# Patient Record
Sex: FEMALE | Race: BLACK OR AFRICAN AMERICAN | NOT HISPANIC OR LATINO | Employment: OTHER | URBAN - METROPOLITAN AREA
[De-identification: names, ages, dates, MRNs, and addresses within clinical notes are randomized per-mention and may not be internally consistent; named-entity substitution may affect disease eponyms.]

---

## 2022-12-21 ENCOUNTER — HOSPITAL ENCOUNTER (OUTPATIENT)
Facility: CLINIC | Age: 22
Discharge: HOME | End: 2022-12-21
Attending: OBSTETRICS & GYNECOLOGY | Admitting: OBSTETRICS & GYNECOLOGY
Payer: MEDICAID

## 2022-12-21 ENCOUNTER — HOSPITAL ENCOUNTER (OUTPATIENT)
Facility: CLINIC | Age: 22
End: 2022-12-21
Admitting: OBSTETRICS & GYNECOLOGY
Payer: MEDICAID

## 2022-12-21 VITALS
HEART RATE: 102 BPM | RESPIRATION RATE: 18 BRPM | SYSTOLIC BLOOD PRESSURE: 122 MMHG | DIASTOLIC BLOOD PRESSURE: 74 MMHG | TEMPERATURE: 97.9 F

## 2022-12-21 PROCEDURE — 250N000013 HC RX MED GY IP 250 OP 250 PS 637: Performed by: STUDENT IN AN ORGANIZED HEALTH CARE EDUCATION/TRAINING PROGRAM

## 2022-12-21 PROCEDURE — 999N000104 HC STATISTIC NO CHARGE

## 2022-12-21 PROCEDURE — G0463 HOSPITAL OUTPT CLINIC VISIT: HCPCS | Mod: 25

## 2022-12-21 PROCEDURE — 250N000013 HC RX MED GY IP 250 OP 250 PS 637: Performed by: OBSTETRICS & GYNECOLOGY

## 2022-12-21 PROCEDURE — 59025 FETAL NON-STRESS TEST: CPT

## 2022-12-21 RX ORDER — LIDOCAINE 40 MG/G
CREAM TOPICAL
Status: DISCONTINUED | OUTPATIENT
Start: 2022-12-21 | End: 2022-12-21 | Stop reason: HOSPADM

## 2022-12-21 RX ORDER — CALCIUM CARBONATE 500 MG/1
500 TABLET, CHEWABLE ORAL DAILY PRN
Status: DISCONTINUED | OUTPATIENT
Start: 2022-12-21 | End: 2022-12-21 | Stop reason: HOSPADM

## 2022-12-21 RX ORDER — ACETAMINOPHEN 325 MG/1
650 TABLET ORAL EVERY 4 HOURS PRN
Status: DISCONTINUED | OUTPATIENT
Start: 2022-12-21 | End: 2022-12-21 | Stop reason: HOSPADM

## 2022-12-21 RX ORDER — DOCUSATE SODIUM 100 MG/1
100 CAPSULE, LIQUID FILLED ORAL 2 TIMES DAILY
Status: DISCONTINUED | OUTPATIENT
Start: 2022-12-21 | End: 2022-12-21 | Stop reason: HOSPADM

## 2022-12-21 RX ADMIN — CALCIUM CARBONATE (ANTACID) CHEW TAB 500 MG 500 MG: 500 CHEW TAB at 18:21

## 2022-12-21 RX ADMIN — DOCUSATE SODIUM 100 MG: 100 CAPSULE, LIQUID FILLED ORAL at 18:31

## 2022-12-21 RX ADMIN — ACETAMINOPHEN 650 MG: 325 TABLET, FILM COATED ORAL at 18:07

## 2022-12-21 ASSESSMENT — ACTIVITIES OF DAILY LIVING (ADL): ADLS_ACUITY_SCORE: 18

## 2022-12-21 NOTE — PROGRESS NOTES
OB Triage Assessment     Josefina Emerson    MRN# [id number]  YOB: 2000      HPI: Josefina Emerson is a 22 year old  at 28w5d by LMP who presents today following a fall. The patient states that she had a fall this morning at 10 am. She states that she did not strike her abdomen or her head but that she did hurt her pubic bone as her legs splayed during the fall. She denies any syncope, chest pain, or shortness of breath. She immigrated from [facility] two weeks ago and has not yet established care.     She reports good fetal movement. Denies LOF, vaginal bleeding, or contractions.  She denies fever, chills, SOB, chest pain, palpitations, N/V, LE swelling/tenderness.  No concerns for headache, vision changes, RUQ or epigastric pain. Patient is very sure that these are not contractions.    Prenatal Labs:   No results found for: ABO, RH, AS, HEPBANG, CHPCRT, GCPCRT, TREPAB, RPR, RUBELLAABIGG, HGB, HIV    GBS Status:   No results found for: GBS      OBHX:   OB History    Para Term  AB Living   5 4 4 0 0 4   SAB IAB Ectopic Multiple Live Births   0 0 0 0 4      # Outcome Date GA Lbr Alejandro/2nd Weight Sex Delivery Anes PTL Lv   5 Current            4 Term            3 Term            2 Term            1 Term                MedicalHX:   History reviewed. No pertinent past medical history.    SurgicalHX:   History reviewed. No pertinent surgical history.    Medications:   No current facility-administered medications on file prior to encounter.  No current outpatient medications on file prior to encounter.      Allergies:  No Known Allergies    FamilyHX:    Family History   Family history unknown: Yes       SocialHX:   Social History     Socioeconomic History    Marital status: Single     Spouse name: None    Number of children: None    Years of education: None    Highest education level: None   Tobacco Use    Smoking status: Never    Smokeless tobacco: Never   Substance and Sexual Activity     Alcohol use: Never    Drug use: Never    Sexual activity: Yes     Partners: Male       ROS: 10 point ROS negative other than above    Physical Exam:  Patient Vitals for the past 24 hrs:   BP Temp Temp src Pulse Resp   22 1608 122/74 97.9  F (36.6  C) Oral 102 18     General: AAOx3, appropriately interactive, NAD, appears generally well  CV: RRR, normal S1/S2, no m/r/g  Lungs: CTAB, non-labored breathing, no wheezes, rales, or rhonchi  Abdomen: soft, gravid, non-tender  Extremities: Non-tender with trace edema bilaterally in LE    FHT: 145 bpm. moderate variability, accels present, no decels   [place]: 0 contractions in ten minutes    Assessment & Plan: 22 year old  at 28w5d by LMP, here for a recent fall. Patient has not yet established care in the . She is currently declining all labs. Given the fact that the patient had a fall at 10 am and is now greater than 4 hours post low impact fall with reassuring fetal monitoring, we discussed discharging to home with follow up at her prenatal appointment that is scheduled for . The patient is amenable to this plan.    # Rule Out Abruption / Abdominal Trauma  - Labs: Patient declining all labs at this time including prenatal and abruption labs. The patient states that she would like to follow up at her prenatal appointment which is scheduled  - Rh unknown: Rhogam not administered   - Greater than 4 hours post fall  - Discussed return precautions including returning to care with any leakage of fluid, contractions, vaginal bleeding, or decreased fetal movement. Also discussed returning to care with any headache, changes in vision, nausea, vomiting, chest pain, shortness of breath, upper abdominal pain, or sudden increases in edema.  - Patient will discharge to home and follow up on  for initial prenatal visit    # PNC  - Prenatal labs declined today  - Imaging: to be organized at her prenatal visit     # FWB:    NST reactive and reassuring at  this time  - Continuous Fetal Monitoring while in triage  - Intrauterine resuscitative measures prn      Patient discussed with Dr. Leoncio Rose MD  OBGYN PGY-3  December 21, 2022 5:53 PM    I personally examined and evaluated Josefina Emerson on 12/21/2022.  I discussed the patient with Dr. Rose and agree with the presentation, exam and plan of care documented in this note with edits by me.   Elise Fang MD MPH

## 2022-12-21 NOTE — ED TRIAGE NOTES
Patient just arrived here from [facility] on December 8th. Patient fell this morning and is currently 8 months pregnant. Patient fell around 10 am in the bathroom. Patient now has abdominal pain. Patient says she felt the baby move this morning before she fell but now has not felt the baby move for the past two hours. Patient reports pain in the genital region.

## 2022-12-22 NOTE — DISCHARGE INSTRUCTIONS
Discharge Instructions for Undelivered Patients    Diet:  * Drink 8 to 12 glasses of liquids (milk, juice, water) every day  * You may eat meals and snacks.    Activity:  * * Count fetal kicks every day   * Call your doctor or nurse midwife if your baby is moving less than usual.    Call your provider if you notice:  * Swelling in your face or increased swelling in your hands or legs.  * Headaches that are not relieved by Tylenol (acetaminophen).  * Changes in your vision (blurring; seeing spots or stars).  * Nausea (sick to your stomach) and vomiting (throwing up).  * Weight gain of 5 pounds per week.  * Heartburn that doesn't go away.  * Signs of bladder infection: Pain when you urinate (use the toilet), needing to go more often or more urgently.  * The bag of kimbrough (membrane) breaks, or you notice leaking in your underwear.  * Bright red blood in your underwear.  * Abdominal (lower belly) or stomach pain.  * For first baby: Contractions (tightenings) less than 5 minutes apart for one hour or more.  * Second (plus) baby: Contractions (tightenings) less than 10 minutes apart and getting stronger.  * Increase or change in vaginal discharge (note the color and amount).

## 2022-12-22 NOTE — PLAN OF CARE
[facility] taking a [facility] in her bathroom this morning. Pt did not hit her belly or her head. She describes slipping on wet floor and doing the splits. She has some muscle pain from this strain. Pt has had no prenatal care. Pt plans to establish care on Dec 30 at [facility]. Pt had no contractions and baby had moderate variability with accelerations present. Discharge instructions explained and understood through .

## 2022-12-30 ENCOUNTER — LAB REQUISITION (OUTPATIENT)
Dept: LAB | Facility: CLINIC | Age: 22
End: 2022-12-30
Payer: MEDICAID

## 2022-12-30 ENCOUNTER — TRANSCRIBE ORDERS (OUTPATIENT)
Dept: MATERNAL FETAL MEDICINE | Facility: CLINIC | Age: 22
End: 2022-12-30

## 2022-12-30 DIAGNOSIS — O09.93 SUPERVISION OF HIGH RISK PREGNANCY, UNSPECIFIED, THIRD TRIMESTER: ICD-10-CM

## 2022-12-30 DIAGNOSIS — O26.90 PREGNANCY RELATED CONDITION, ANTEPARTUM: Primary | ICD-10-CM

## 2022-12-30 PROCEDURE — 86803 HEPATITIS C AB TEST: CPT | Mod: ORL | Performed by: MIDWIFE

## 2022-12-30 PROCEDURE — 86762 RUBELLA ANTIBODY: CPT | Mod: ORL | Performed by: MIDWIFE

## 2022-12-30 PROCEDURE — 87340 HEPATITIS B SURFACE AG IA: CPT | Mod: ORL | Performed by: MIDWIFE

## 2022-12-30 PROCEDURE — 85660 RBC SICKLE CELL TEST: CPT | Mod: ORL | Performed by: MIDWIFE

## 2022-12-30 PROCEDURE — 86780 TREPONEMA PALLIDUM: CPT | Mod: ORL | Performed by: MIDWIFE

## 2022-12-30 PROCEDURE — 87491 CHLMYD TRACH DNA AMP PROBE: CPT | Mod: ORL | Performed by: MIDWIFE

## 2022-12-30 PROCEDURE — 86850 RBC ANTIBODY SCREEN: CPT | Mod: ORL | Performed by: MIDWIFE

## 2022-12-30 PROCEDURE — 87591 N.GONORRHOEAE DNA AMP PROB: CPT | Mod: ORL | Performed by: MIDWIFE

## 2022-12-30 PROCEDURE — 83020 HEMOGLOBIN ELECTROPHORESIS: CPT | Mod: ORL | Performed by: MIDWIFE

## 2022-12-30 PROCEDURE — 87086 URINE CULTURE/COLONY COUNT: CPT | Mod: ORL | Performed by: MIDWIFE

## 2022-12-30 PROCEDURE — 86901 BLOOD TYPING SEROLOGIC RH(D): CPT | Mod: ORL | Performed by: MIDWIFE

## 2022-12-30 PROCEDURE — 82306 VITAMIN D 25 HYDROXY: CPT | Mod: ORL | Performed by: MIDWIFE

## 2022-12-30 PROCEDURE — 86706 HEP B SURFACE ANTIBODY: CPT | Mod: ORL | Performed by: MIDWIFE

## 2022-12-31 LAB
ABO/RH(D): NORMAL
ANTIBODY SCREEN: NEGATIVE
C TRACH DNA SPEC QL NAA+PROBE: NEGATIVE
DEPRECATED CALCIDIOL+CALCIFEROL SERPL-MC: 18 UG/L (ref 20–75)
HBV SURFACE AB SERPL IA-ACNC: 0 M[IU]/ML
HBV SURFACE AB SERPL IA-ACNC: NONREACTIVE M[IU]/ML
HBV SURFACE AG SERPL QL IA: NONREACTIVE
HCV AB SERPL QL IA: NONREACTIVE
N GONORRHOEA DNA SPEC QL NAA+PROBE: NEGATIVE
SPECIMEN EXPIRATION DATE: NORMAL
T PALLIDUM AB SER QL: NONREACTIVE

## 2023-01-01 LAB
BACTERIA UR CULT: NORMAL
HGB A1 MFR BLD: 96.7 %
HGB A2 MFR BLD: 2.8 %
HGB C MFR BLD: 0 %
HGB E MFR BLD: 0 %
HGB F MFR BLD: 0.5 %
HGB FRACT BLD ELPH-IMP: NORMAL
HGB OTHER MFR BLD: 0 %
HGB S BLD QL SOLY: NORMAL
HGB S MFR BLD: 0 %
PATH INTERP BLD-IMP: NORMAL

## 2023-01-02 ENCOUNTER — PRE VISIT (OUTPATIENT)
Dept: MATERNAL FETAL MEDICINE | Facility: CLINIC | Age: 23
End: 2023-01-02

## 2023-01-02 LAB
RUBV IGG SERPL QL IA: 3.03 INDEX
RUBV IGG SERPL QL IA: POSITIVE

## 2023-01-03 ENCOUNTER — HOSPITAL ENCOUNTER (OUTPATIENT)
Dept: ULTRASOUND IMAGING | Facility: CLINIC | Age: 23
Discharge: HOME | End: 2023-01-03
Attending: MIDWIFE
Payer: MEDICAID

## 2023-01-03 ENCOUNTER — OFFICE VISIT (OUTPATIENT)
Dept: MATERNAL FETAL MEDICINE | Facility: CLINIC | Age: 23
End: 2023-01-03
Attending: MIDWIFE
Payer: MEDICAID

## 2023-01-03 DIAGNOSIS — O26.90 PREGNANCY RELATED CONDITION, ANTEPARTUM: ICD-10-CM

## 2023-01-03 DIAGNOSIS — Z36.89 ENCOUNTER FOR ULTRASOUND TO ASSESS FETAL GROWTH: ICD-10-CM

## 2023-01-03 DIAGNOSIS — O09.33 LATE PRENATAL CARE AFFECTING PREGNANCY IN THIRD TRIMESTER: Primary | ICD-10-CM

## 2023-01-03 PROCEDURE — 76811 OB US DETAILED SNGL FETUS: CPT | Mod: 26 | Performed by: OBSTETRICS & GYNECOLOGY

## 2023-01-03 PROCEDURE — 99212 OFFICE O/P EST SF 10 MIN: CPT | Mod: 25 | Performed by: OBSTETRICS & GYNECOLOGY

## 2023-01-03 PROCEDURE — 76811 OB US DETAILED SNGL FETUS: CPT

## 2023-01-03 NOTE — PROGRESS NOTES
The patient was seen for an ultrasound in the [facility] at the [facility] today.  For a detailed report of the ultrasound examination, please see the ultrasound report which can be found under the imaging tab.    Rand Saravia MD  , OB/GYN  Maternal-Fetal Medicine  [phone] (Pager)